# Patient Record
Sex: FEMALE | Race: WHITE | Employment: OTHER | URBAN - METROPOLITAN AREA
[De-identification: names, ages, dates, MRNs, and addresses within clinical notes are randomized per-mention and may not be internally consistent; named-entity substitution may affect disease eponyms.]

---

## 2019-03-18 ENCOUNTER — THERAPY VISIT (OUTPATIENT)
Dept: PHYSICAL THERAPY | Facility: CLINIC | Age: 74
End: 2019-03-18
Payer: COMMERCIAL

## 2019-03-18 DIAGNOSIS — M54.50 ACUTE BILATERAL LOW BACK PAIN WITHOUT SCIATICA: Primary | ICD-10-CM

## 2019-03-18 PROCEDURE — 97161 PT EVAL LOW COMPLEX 20 MIN: CPT | Mod: GP | Performed by: PHYSICAL THERAPIST

## 2019-03-18 PROCEDURE — 97110 THERAPEUTIC EXERCISES: CPT | Mod: GP | Performed by: PHYSICAL THERAPIST

## 2019-03-18 PROCEDURE — 97530 THERAPEUTIC ACTIVITIES: CPT | Mod: GP | Performed by: PHYSICAL THERAPIST

## 2019-03-18 NOTE — PROGRESS NOTES
[facility] Initial Evaluation -- Lumbar    Date: March 18, 2019  Divina Guillen is a 73 year old female with a L LS condition.   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   DOI [id number]  Symptoms (improving/unchanging/worsening):  Worsening     Symptoms commenced as a result of: shoveling  Condition occurred in the following environment:  home  Symptoms at onset (back/thigh/leg): back  Constant symptoms (back/thigh/leg): yes  Intermittent symptoms (back/thigh/leg):     Symptoms are made worse with the following: Always Bending, Always Sitting, Always Standing, Time of day -at end of day and Always  Symptoms are made better with the following: Always Lying    Disturbed sleep (yes/no):  With knees to chest Sleeping postures (prone/sup/side R/L):     Previous episodes (0/1-5/6-10/11+): 1,  L sided sciatica Year of first episode: 2013    Previous history:   Previous treatments: extension responder      Specific Questions:  Cough/Sneeze/Strain (pos/neg): sneezing  Bowel/Bladder (normal/abnormal): normal  Gait (normal/abnormal): abnormal  Medications (nil/NSAIDS/analg/steroids/anticoag/other):  Muscle relaxants  Medical allergies:  none  General health (excellent/good/fair/poor):  good  Pertinent medical history:  None  Imaging (None/Xray/MRI/Other):  none  Recent or major surgery (yes/no):  no  Night pain (yes/no): no  Accidents (yes/no): no  Unexplained weight loss (yes/no): no  Barriers at  home: none  Other red flags: none    EXAMINATION    Posture:   Sitting (good/fair/poor): poor  Standing (good/fair/poor):fair  Lordosis (red/acc/normal): dec  Correction of posture (better/worse/no effect): worse    Lateral Shift (right/left/nil): no  Relevant (yes/no):    Other Observations:     Neurological:    Motor deficit:    Reflexes:    Sensory deficit:    Dural signs:      Movement Loss:   Timoteo Mod Min Nil Pain   Flexion   +lower tibias  PDM hip and LS   Extension  +   ERP hip   Side Gliding R    + L lat hip pulling   Side Gliding L    +      Test Movements:   During: produces, abolishes, increases, decreases, no effect, centralizing, peripheralizing   After: better, worse, no better, no worse, no effect, centralized, peripheralized    Pretest symptoms standing: central LS   Symptoms During Symptoms After ROM increased ROM decreased No Effect   FIS Increases    No Worse         Rep FIS Increases    No Worse      +   EIS Increases   ERP No Worse         Rep EIS Increases  ERP lat hip   No Worse    +     Pretest symptoms lying: L lat hip pain 6/10   Symptoms During Symptoms After ROM increased ROM decreased No Effect   ISABELA prone pre test 3/10 lat hip Decreases    No Better         Rep ISABELA Decreases    Better    + flex and ext     EIL          Rep EIL          If required, pretest symptoms:    Symptoms During Symptoms After ROM increased ROM decreased No Effect   SGIS - R          Rep SGIS - R          SGIS - L          Rep SGIS - L            Static Tests:  Sitting slouched:    Sitting erect:    Standing slouched   Standing erect:    Lying prone in extension:   Long sitting:      Other Tests: pre test sitting w/o L roll 0/10  FISIt 10 x produces LBP DMotion BUT decreased with reps, better sitting SG R inc ROM flex and ext    Provisional Classification:  Derangement - Asymmetrical, unilateral, symptoms above knee    Principle of Management:  Education:  DP HEP    Equipment provided:    Mechanical therapy (Y/N):   Y   Extension principle:    Lateral Principle:    Flexion principle:   FISit 10 x q 2hrs and q 1 hr on plane Other:      ASSESSMENT/PLAN:    Patient is a 73 year old female with lumbar complaints.    Patient has the following significant findings with corresponding treatment plan.                Diagnosis 1:  LBP  Pain -  self management, education, directional preference exercise and home program  Decreased ROM/flexibility - therapeutic exercise, therapeutic activity and home program  Decreased joint mobility - therapeutic exercise, therapeutic activity and home program  Decreased function - therapeutic activities and home program    Previous and current functional limitations:  (See Goal Flow Sheet for this information)    Short term and Long term goals: (See Goal Flow Sheet for this information)     Communication ability:  Patient appears to be able to clearly communicate and understand verbal and written communication and follow directions correctly.  Treatment Explanation - The following has been discussed with the patient:   RX ordered/plan of care  Anticipated outcomes  Possible risks and side effects  This patient would benefit from PT intervention to resume normal activities.   Rehab potential is good.    Frequency:  1 X week, once daily  Duration:  for 6 weeks  Discharge Plan:  Achieve all LTG.  Independent in home treatment program.  Reach maximal therapeutic benefit.    Please refer to the daily flowsheet for treatment today, total treatment time and time spent performing 1:1 timed codes.

## 2019-03-18 NOTE — LETTER
[facility]  [address]   [place] [address]  [phone]    2019    Re: Divina Guillen   :   1945  MRN:  [id number]   REFERRING PHYSICIAN:   Maddison Cutler    [facility]    Date of Initial Evaluation:  2019  Visits:  Rxs Used: 1  Reason for Referral:  Acute bilateral low back pain without sciatica    EVALUATION SUMMARY    [facility] Initial Evaluation -- Lumbar    Date: 2019  Divina Guillen is a 73 year old female with a L LS condition.   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   DOI [id number]  Symptoms (improving/unchanging/worsening):  Worsening     Symptoms commenced as a result of: shoveling  Condition occurred in the following environment:  home  Symptoms at onset (back/thigh/leg): back  Constant symptoms (back/thigh/leg): yes  Intermittent symptoms (back/thigh/leg):     Symptoms are made worse with the following: Always Bending, Always Sitting, Always Standing, Time of day -at end of day and Always  Symptoms are made better with the following: Always Lying    Disturbed sleep (yes/no):  With knees to chest Sleeping postures (prone/sup/side R/L):     Previous episodes (0/1-5/6-10/+): 1,  L sided sciatica Year of first episode:     Previous history:   Previous treatments: extension responder    Specific Questions:  Cough/Sneeze/Strain  (pos/neg): sneezing  Bowel/Bladder (normal/abnormal): normal  Gait (normal/abnormal): abnormal  Medications (nil/NSAIDS/analg/steroids/anticoag/other):  Muscle relaxants  Medical allergies:  none  General health (excellent/good/fair/poor):  good  Pertinent medical history:  None  Imaging (None/Xray/MRI/Other):  none  Recent or major surgery (yes/no):  no  Night pain (yes/no): no  Accidents (yes/no): no  Unexplained weight loss (yes/no): no  Barriers at home: none  Other red flags: none    EXAMINATION    Posture:   Sitting (good/fair/poor): poor  Standing (good/fair/poor):fair  Lordosis (red/acc/normal): dec  Correction of posture (better/worse/no effect): worse    Lateral Shift (right/left/nil): no  Relevant (yes/no):    Other Observations:     Neurological:    Motor deficit:    Reflexes:    Sensory deficit:    Dural signs:      Movement Loss:   Timoteo Mod Min Nil Pain   Flexion   +lower tibias  PDM hip and LS   Extension  +   ERP hip   Side Gliding R    + L lat hip pulling   Side Gliding L    +      Test Movements:   During: produces, abolishes, increases, decreases, no effect, centralizing, peripheralizing   After: better, worse, no better, no worse, no effect, centralized, peripheralized    Pretest symptoms standing: central LS   Symptoms During Symptoms After ROM increased ROM decreased No Effect   FIS Increases    No Worse         Rep FIS Increases    No Worse      +   EIS Increases   ERP No Worse         Rep EIS Increases  ERP lat hip   No Worse    +     Pretest symptoms lying: L lat hip pain 6/10   Symptoms During Symptoms After ROM increased ROM decreased No Effect   ISABELA prone pre test 3/10 lat hip Decreases    No Better         Rep ISABELA Decreases    Better    + flex and ext     EIL          Rep EIL          If required, pretest symptoms:    Symptoms During Symptoms After ROM increased ROM decreased No Effect   SGIS - R          Rep SGIS - R          SGIS - L          Rep SGIS - L            Static  Tests:  Sitting slouched:    Sitting erect:    Standing slouched   Standing erect:    Lying prone in extension:   Long sitting:      Other Tests: pre test sitting w/o L roll 0/10  FISIt 10 x produces LBP DMotion BUT decreased with reps, better sitting SG R inc ROM flex and ext    Provisional Classification:  Derangement - Asymmetrical, unilateral, symptoms above knee    Principle of Management:  Education:  DP HEP    Equipment provided:    Mechanical therapy (Y/N):  Y   Extension principle:    Lateral Principle:    Flexion principle:   FISit 10 x q 2hrs and q 1 hr on plane Other:      ASSESSMENT/PLAN:    Patient is a 73 year old female with lumbar complaints.    Patient has the following significant findings with corresponding treatment plan.                Diagnosis 1:  LBP  Pain -  self management, education, directional preference exercise and home program  Decreased ROM/flexibility - therapeutic exercise, therapeutic activity and home program  Decreased joint mobility - therapeutic exercise, therapeutic activity and home program  Decreased function - therapeutic activities and home program    Previous and current functional limitations:  (See Goal Flow Sheet for this information)    Short term and Long term goals: (See Goal Flow Sheet for this information)     Communication ability:  Patient appears to be able to clearly communicate and understand verbal and written communication and follow directions correctly.  Treatment Explanation - The following has been discussed with the patient:   RX ordered/plan of care  Anticipated outcomes  Possible risks and side effects  This patient would benefit from PT intervention to resume normal activities.   Rehab potential is good.    Frequency:  1 X week, once daily  Duration:  for 6 weeks  Discharge Plan:  Achieve all LTG.  Independent in home treatment program.  Reach maximal therapeutic benefit.    Thank you for your referral.    INQUIRIES  Therapist: Donna Giang  PT  [facility]  [address]  [place] [address]  Phone: [phone]  Fax: [phone]

## 2019-03-27 ENCOUNTER — THERAPY VISIT (OUTPATIENT)
Dept: PHYSICAL THERAPY | Facility: CLINIC | Age: 74
End: 2019-03-27
Payer: COMMERCIAL

## 2019-03-27 DIAGNOSIS — M54.50 ACUTE BILATERAL LOW BACK PAIN WITHOUT SCIATICA: ICD-10-CM

## 2019-03-27 PROCEDURE — 97110 THERAPEUTIC EXERCISES: CPT | Mod: GP | Performed by: PHYSICAL THERAPY ASSISTANT

## 2019-03-27 PROCEDURE — 97530 THERAPEUTIC ACTIVITIES: CPT | Mod: GP | Performed by: PHYSICAL THERAPY ASSISTANT

## 2019-04-10 ENCOUNTER — THERAPY VISIT (OUTPATIENT)
Dept: PHYSICAL THERAPY | Facility: CLINIC | Age: 74
End: 2019-04-10
Payer: COMMERCIAL

## 2019-04-10 DIAGNOSIS — M54.50 ACUTE BILATERAL LOW BACK PAIN WITHOUT SCIATICA: Primary | ICD-10-CM

## 2019-04-10 PROCEDURE — 97530 THERAPEUTIC ACTIVITIES: CPT | Mod: GP | Performed by: PHYSICAL THERAPIST

## 2019-04-10 PROCEDURE — 97110 THERAPEUTIC EXERCISES: CPT | Mod: GP | Performed by: PHYSICAL THERAPIST

## 2019-04-24 ENCOUNTER — THERAPY VISIT (OUTPATIENT)
Dept: PHYSICAL THERAPY | Facility: CLINIC | Age: 74
End: 2019-04-24
Payer: COMMERCIAL

## 2019-04-24 DIAGNOSIS — M54.50 ACUTE BILATERAL LOW BACK PAIN WITHOUT SCIATICA: Primary | ICD-10-CM

## 2019-04-24 PROCEDURE — 97110 THERAPEUTIC EXERCISES: CPT | Mod: GP | Performed by: PHYSICAL THERAPIST

## 2019-04-24 NOTE — PROGRESS NOTES
DISCHARGE REPORT    Progress reporting period is from [id number] to [id number]    SUBJECTIVE  Subjective changes noted by patient: Pt. was gardening for 1.5 hrs and had increased pain so did the exercises and it helped. she is able to walk 2 miles on TM and sit thru movie.    Current Pain level: 0/10.     Initial Pain level: 5/10.   Changes in function:  Yes (See Goal flowsheet attached for changes in current functional level)  Adverse reaction to treatment or activity: None    OBJECTIVE  Objective: able to progress bridge today. TrA 3+/5. HAB  4+/5 improved for both.      ASSESSMENT/PLAN  Updated problem list and treatment plan:   Diagnosis 1:  LBP  Pain -  home program  Decreased ROM/flexibility - home program  STG/LTGs have been met or progress has been made towards goals:  Yes (See Goal flow sheet completed today.)  Assessment of Progress: The patient has met all of their long term goals.  Self Management Plans:  Patient is independent in a home treatment program.  Patient is independent in self management of symptoms.    Divina continues to require the following intervention to meet STG and LTG's:  PT intervention is no longer required to meet STG/LTG.    Recommendations:  This patient is ready to be discharged from therapy and continue their home treatment program.

## 2020-09-30 ENCOUNTER — THERAPY VISIT (OUTPATIENT)
Dept: PHYSICAL THERAPY | Facility: CLINIC | Age: 75
End: 2020-09-30
Payer: COMMERCIAL

## 2020-09-30 DIAGNOSIS — M54.2 NECK PAIN: Primary | ICD-10-CM

## 2020-09-30 PROCEDURE — 97110 THERAPEUTIC EXERCISES: CPT | Mod: GP | Performed by: PHYSICAL THERAPIST

## 2020-09-30 PROCEDURE — 97161 PT EVAL LOW COMPLEX 20 MIN: CPT | Mod: GP | Performed by: PHYSICAL THERAPIST

## 2020-09-30 PROCEDURE — 97530 THERAPEUTIC ACTIVITIES: CPT | Mod: GP | Performed by: PHYSICAL THERAPIST

## 2020-09-30 NOTE — PROGRESS NOTES
[facility] Initial Evaluation -- Cervical    Evaluation Date: September 30, 2020  Divina Guillen is a 75 year old female with a neck condition.   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  she slept in a chair for a few days at onset. Shifting car and doing seat belt produced upper arm pain. Neck really stiff. LHD.  Pain quality (sharp/shooting/stabbing/aching/burning/cramping):   Achy burning  Paresthesia (yes/no):  no    Present since (onset date): MD order [id number].   3 weeks ago for LARA, gradually.   Symptoms (improving/unchanging/worsening):  Improving with steroid    Symptoms commenced as a result of:   Condition occurred in the following environment:      Symptoms at onset (neck/arm/forearm/headache): CS  Constant symptoms (neck/arm/forearm/headache):   Intermittent symptoms (neck/arm/forearm/headache): yes for all    Symptoms are made worse with the following: Lying and shifting car turning head pulling seat belt  Bending reaching always  Symptoms are made better with the following: steroid     Disturbed sleep (yes/no): SL on either side  Number of pillows:   Sleeping postures (prone/sup/side R/L): SL    Previous episodes (0/1-5/6-10/11+): 0 Year of first episode:     Previous history:   Previous treatments:     Specific Questions: (as reported by the patient)  Dizziness/Tinnitus/Nausea/Swallowing (pos/neg): neg  Gait/Upper Limbs (normal/abnormal): normal  Medications (nil/NSAIDS/anlag/steroids/anticoag/other):  Narcotics/Opiods  Medical allergies:  none  General health (excellent/good/fair/poor):  excellent  Pertinent medical history:  none  Imaging (None/Xray/MRI/Other):  XR neg  Recent or major surgery (yes/no): no  Night pain  (yes/no):   Accidents (yes/no):   Unexplained weight loss (yes/no): na  Barriers at home: lives alone  Other red flags: none    EXAMINATION    Posture:   Sitting (good/fair/poor): poor   Standing (good/fair/poor): poor     Protruded head (yes/no): yes    Wry Neck (right/left/nil):  no  Relevant (yes/no):       Correction of posture(better/worse/no effect): better  Other observations:      Neurological:    Motor Deficit:     Reflexes:    Sensory Deficit:     Dural signs:      Movement Loss:   Timoteo Mod Min Nil Pain   Protrusion    + Sub occ   Flexion    + R BOH   Retraction   +  BOH   Extension  +   decreases   Lateral flexion R +       Lateral flexion L  +      Rotation R     Pain BOH R side    Rotation L  +   Pain BOH     Test Movements:   During: produces, abolishes, increases, decreases, no effect, centralizing, peripheralizing  After: better, worse, no better, no worse, no effect, centralized, peripheralized    Pretest symptoms sittin/10   Symptoms During Symptoms After ROM increased ROM decreased No Effect   PRO          Rep PRO          RET          Rep RET          RET EXT          Rep RET EXT Decreases    No better    + rot B extension     Pretest symptoms lying:     Symptoms During Symptoms After ROM increased ROM decreased No Effect   RET          Rep RET          RET EXT          Rep RET EXT          If required, pretest symptoms sitting:            Symptoms During Symptoms After ROM increased ROM decreased No Effect   LF-R          Rep LF-R          LF-L          Rep LF-L          ROT-R          Rep ROT-R          ROT-L          Rep ROT-L          FLEX          Rep FLEX              Static Tests:   Protrusion:          Flexion:    Retraction:    Extension (sitting/prone/supine):      Other Tests:     Provisional Classification:  Inconclusive/Other - needs further testing    Principle of Management:  Education:  Proper sitting posture to avoid FH. Use of towel BB in vertical fashion.    Equipment  provided:    Mechanical therapy (Y/N):  Y   Extension principle:     Lateral principle:    Flexion principle:     Other:      ASSESSMENT/PLAN:    Patient is a 75 year old female with cervical complaints.    Patient has the following significant findings with corresponding treatment plan.                Diagnosis 1:  Neck pain  Pain -  manual therapy, self management, education, directional preference exercise and home program  Decreased ROM/flexibility - manual therapy, therapeutic exercise, therapeutic activity and home program  Decreased joint mobility - manual therapy, therapeutic exercise, therapeutic activity and home program  Decreased function - therapeutic activities and home program  Impaired posture - neuro re-education, therapeutic activities and home program        Previous and current functional limitations:  (See Goal Flow Sheet for this information)    Short term and Long term goals: (See Goal Flow Sheet for this information)     Communication ability:  Patient appears to be able to clearly communicate and understand verbal and written communication and follow directions correctly.  Treatment Explanation - The following has been discussed with the patient:   RX ordered/plan of care  Anticipated outcomes  Possible risks and side effects  This patient would benefit from PT intervention to resume normal activities.   Rehab potential is good.    Frequency:  1 X week, once daily  Duration:  for 6 weeks  Discharge Plan:  Achieve all LTG.  Independent in home treatment program.  Reach maximal therapeutic benefit.    Please refer to the daily flowsheet for treatment today, total treatment time and time spent performing 1:1 timed codes.

## 2020-09-30 NOTE — LETTER
[facility] ATHLETIC [facility] PHYSICAL THERAPY  [address]  [facility] [address]  [phone]    2020    Re: Divina Guillen   :   1945  MRN:  [id number]   REFERRING PHYSICIAN:   Almita Turcios    [facility]THLETIC [facility] PHYSICAL [facility]    Date of Initial Evaluation:  2020  Visits:   1  Reason for Referral:  Neck pain     [facility] Athletic [facility] Initial Evaluation -- Cervical  Evaluation Date: 2020  Divina Guillen is a 75 year old female with a neck condition.   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  she slept in a chair for a few days at onset. Shifting car and doing seat belt produced upper arm pain. Neck really stiff. LHD.  Pain quality (sharp/shooting/stabbing/aching/burning/cramping):   Achy burning  Paresthesia (yes/no):  no  Present since (onset date): MD order [id number].   3 weeks ago for LARA, gradually.   Symptoms (improving/unchanging/worsening):  Improving with steroid  Symptoms commenced as a result of:   Condition occurred in the following environment:    Symptoms at onset (neck/arm/forearm/headache): CS  Constant symptoms (neck/arm/forearm/headache):   Intermittent symptoms (neck/arm/forearm/headache): yes for all  Symptoms are made worse with the following: Lying and shifting car turning head pulling seat belt  Bending reaching always  Symptoms are made better with the following: steroid   Disturbed sleep (yes/no): SL on either side    Number of pillows:   Sleeping postures (prone/sup/side R/L): SL  Previous episodes (0/1-5/6-10/11+): 0   Year of first episode:   Previous history:   Previous treatments:     Re: Divina Guillen   :    1945    Specific Questions: (as reported by the patient)  Dizziness/Tinnitus/Nausea/Swallowing (pos/neg): neg  Gait/Upper Limbs (normal/abnormal): normal  Medications (nil/NSAIDS/anlag/steroids/anticoag/other):  Narcotics/Opiods  Medical allergies:  none  General health (excellent/good/fair/poor):  excellent  Pertinent medical history:  none  Imaging (None/Xray/MRI/Other):  XR neg  Recent or major surgery (yes/no): no  Night pain (yes/no):   Accidents (yes/no):   Unexplained weight loss (yes/no): na  Barriers at home: lives alone  Other red flags: none  EXAMINATION  Posture:   Sitting (good/fair/poor): poor     Standing (good/fair/poor): poor   Protruded head (yes/no): yes    Wry Neck (right/left/nil):  no    Relevant (yes/no):     Correction of posture(better/worse/no effect): better  Other observations:    Neurological:  Motor Deficit:     Reflexes:    Sensory Deficit:     Dural signs:    Movement Loss:   Timoteo Mod Min Nil Pain   Protrusion    + Sub occ   Flexion    + R BOH   Retraction   +  BOH   Extension  +   decreases   Lateral flexion R +       Lateral flexion L  +      Rotation R     Pain BOH R side    Rotation L  +   Pain BOH   Test Movements:   During: produces, abolishes, increases, decreases, no effect, centralizing, peripheralizing  After: better, worse, no better, no worse, no effect, centralized, peripheralized  Pretest symptoms sittin/10   Symptoms During Symptoms After ROM increased ROM decreased No Effect   PRO          Rep PRO          RET          Rep RET          RET EXT          Rep RET EXT Decreases    No better    + rot B extension     Re: Divina Guillen   :   1945    Pretest symptoms lying:     Symptoms During Symptoms After ROM increased ROM decreased No Effect   RET          Rep RET          RET EXT          Rep RET EXT          If required, pretest symptoms sitting:     Symptoms During Symptoms After ROM increased ROM decreased No Effect   LF-R          Rep LF-R          LF-L           Rep LF-L          ROT-R          Rep ROT-R          ROT-L          Rep ROT-L          FLEX          Rep FLEX          Static Tests:   Protrusion:    Flexion:    Retraction:      Extension (sitting/prone/supine):    Other Tests:   Provisional Classification:  Inconclusive/Other - needs further testing  Principle of Management:  Education:  Proper sitting posture to avoid FH. Use of towel BB in vertical fashion  Equipment provided:    Mechanical therapy (Y/N):  Y   Extension principle:       Lateral principle:    Flexion principle:       Other:      ASSESSMENT/PLAN:  Patient is a 75 year old female with cervical complaints.    Patient has the following significant findings with corresponding treatment plan.                Diagnosis 1:  Neck pain  Pain -  manual therapy, self management, education, directional preference exercise and home program  Decreased ROM/flexibility - manual therapy, therapeutic exercise, therapeutic activity and home program  Decreased joint mobility - manual therapy, therapeutic exercise, therapeutic activity and home program  Decreased function - therapeutic activities and home program  Impaired posture - neuro re-education, therapeutic activities and home program  Previous and current functional limitations:  (See Goal Flow Sheet for this information)    Short term and Long term goals: (See Goal Flow Sheet for this information)   Re: Divina Guillen   :   1945    ASSESSMENT/PLAN: (continued)  Communication ability:  Patient appears to be able to clearly communicate and understand verbal and written communication and follow directions correctly.  Treatment Explanation - The following has been discussed with the patient:   RX ordered/plan of care, Anticipated outcomes, Possible risks and side effects    This patient would benefit from PT intervention to resume normal activities.   Rehab potential is good.  Frequency:  1 X week, once daily  Duration:  for 6 weeks  Discharge Plan:   Achieve all LTG.  Independent in home treatment program.  Reach maximal therapeutic benefit.    Thank you for your referral.    INQUIRIES        Therapist:  Donna Giang PT, Cert MDT  [facility] PHYSICAL THERAPY  [address] [facility] [address]  [facility] [address]  Phone: [phone]  Fax: [phone]

## 2020-10-05 ENCOUNTER — THERAPY VISIT (OUTPATIENT)
Dept: PHYSICAL THERAPY | Facility: CLINIC | Age: 75
End: 2020-10-05
Payer: COMMERCIAL

## 2020-10-05 DIAGNOSIS — M54.2 NECK PAIN: ICD-10-CM

## 2020-10-05 PROCEDURE — 97110 THERAPEUTIC EXERCISES: CPT | Mod: GP | Performed by: PHYSICAL THERAPY ASSISTANT

## 2020-10-05 PROCEDURE — 97530 THERAPEUTIC ACTIVITIES: CPT | Mod: GP | Performed by: PHYSICAL THERAPY ASSISTANT

## 2020-10-15 ENCOUNTER — THERAPY VISIT (OUTPATIENT)
Dept: PHYSICAL THERAPY | Facility: CLINIC | Age: 75
End: 2020-10-15
Payer: COMMERCIAL

## 2020-10-15 DIAGNOSIS — M54.2 NECK PAIN: ICD-10-CM

## 2020-10-15 PROCEDURE — 97530 THERAPEUTIC ACTIVITIES: CPT | Mod: GP | Performed by: PHYSICAL THERAPY ASSISTANT

## 2020-10-15 PROCEDURE — 97110 THERAPEUTIC EXERCISES: CPT | Mod: GP | Performed by: PHYSICAL THERAPY ASSISTANT

## 2020-10-19 ENCOUNTER — THERAPY VISIT (OUTPATIENT)
Dept: PHYSICAL THERAPY | Facility: CLINIC | Age: 75
End: 2020-10-19
Payer: COMMERCIAL

## 2020-10-19 DIAGNOSIS — M54.2 NECK PAIN: ICD-10-CM

## 2020-10-19 PROCEDURE — 97110 THERAPEUTIC EXERCISES: CPT | Mod: GP | Performed by: PHYSICAL THERAPY ASSISTANT

## 2020-10-26 ENCOUNTER — THERAPY VISIT (OUTPATIENT)
Dept: PHYSICAL THERAPY | Facility: CLINIC | Age: 75
End: 2020-10-26
Payer: COMMERCIAL

## 2020-10-26 DIAGNOSIS — M54.2 NECK PAIN: ICD-10-CM

## 2020-10-26 PROCEDURE — 97110 THERAPEUTIC EXERCISES: CPT | Mod: GP | Performed by: PHYSICAL THERAPY ASSISTANT

## 2020-10-26 PROCEDURE — 97530 THERAPEUTIC ACTIVITIES: CPT | Mod: GP | Performed by: PHYSICAL THERAPY ASSISTANT

## 2020-11-09 ENCOUNTER — THERAPY VISIT (OUTPATIENT)
Dept: PHYSICAL THERAPY | Facility: CLINIC | Age: 75
End: 2020-11-09
Payer: COMMERCIAL

## 2020-11-09 DIAGNOSIS — M54.50 ACUTE BILATERAL LOW BACK PAIN WITHOUT SCIATICA: ICD-10-CM

## 2020-11-09 DIAGNOSIS — M25.512 BILATERAL SHOULDER PAIN, UNSPECIFIED CHRONICITY: Primary | ICD-10-CM

## 2020-11-09 DIAGNOSIS — M25.511 BILATERAL SHOULDER PAIN, UNSPECIFIED CHRONICITY: Primary | ICD-10-CM

## 2020-11-09 PROCEDURE — 97110 THERAPEUTIC EXERCISES: CPT | Mod: GP | Performed by: PHYSICAL THERAPIST

## 2020-11-09 PROCEDURE — 97530 THERAPEUTIC ACTIVITIES: CPT | Mod: GP | Performed by: PHYSICAL THERAPIST

## 2020-11-09 NOTE — PROGRESS NOTES
PROGRESS  REPORT    Progress reporting period is from [id number]  to [id number]       SUBJECTIVE  Subjective changes noted by patient:  Subjective: Pt reports her neck ROM is wnl and her pain is minimal. Her upper arm pain is bad bilaterally and symmetrical with sleeping, reaching OH, to side and BB.     .     Initial Pain level: 6/10.   Changes in function:  Yes (See Goal flowsheet attached for changes in current functional level) for neck.  Adverse reaction to treatment or activity: None    OBJECTIVE  Objective: standing posture is fair to poor with rounded shds and FHP and she is able to correct with cues. AROM R shd:  abd 90 ER min loss ext 38 IR 1 inch above waistline       ASSESSMENT/PLAN  Updated problem list and treatment plan:   Diagnosis 1:  Neck pain, shd pain  Pain -  manual therapy, self management, education, directional preference exercise and home program  Decreased ROM/flexibility - manual therapy, therapeutic exercise, therapeutic activity and home program  Decreased joint mobility - manual therapy, therapeutic exercise, therapeutic activity and home program  Decreased function - therapeutic activities and home program  STG/LTGs have been met or progress has been made towards goals:  None  Assessment of Progress: The patient's condition is unchanged.  Self Management Plans:  Patient is independent in a home treatment program.  Patient is independent in self management of symptoms.    Divina continues to require the following intervention to meet STG and LTG's:  PT    Recommendations:  This patient would benefit from continued therapy.     Frequency:  1 X week, once daily  Duration:  for 6 weeks for shoulders.

## 2020-11-09 NOTE — LETTER
[facility] ATHLETIC [facility] PHYSICAL THERAPY  [address]  [facility] [address]  [phone]    November 10, 2020    Re: Divina Guillen   :   1945  MRN:  [id number]   REFERRING PHYSICIAN:   Almita Turcios    [facility] ATHLETIC [facility] PHYSICAL [facility]    Date of Initial Evaluation:  2020  Visits:   6  Reason for Referral:     Acute bilateral low back pain without sciatica  Bilateral shoulder pain, unspecified chronicity    PROGRESS  REPORT: Progress reporting period is from   to [id number]       SUBJECTIVE  Subjective changes noted by patient:  Subjective: Pt reports her neck ROM is wnl and her pain is minimal. Her upper arm pain is bad bilaterally and symmetrical with sleeping, reaching OH, to side and BB.  .     Initial Pain level: 6/10.   Changes in function:  Yes (See Goal flowsheet attached for changes in current functional level) for neck.  Adverse reaction to treatment or activity: None    OBJECTIVE  Objective: standing posture is fair to poor with rounded shds and FHP and she is able to correct with cues. AROM R shd:  abd 90 ER min loss ext 38 IR 1 inch above waistline     ASSESSMENT/PLAN  Updated problem list and treatment plan:   Diagnosis 1:  Neck pain, shd pain  Pain -  manual therapy, self management, education, directional preference exercise and home program  Decreased ROM/flexibility - manual therapy, therapeutic exercise, therapeutic activity and home program  Decreased joint mobility - manual therapy, therapeutic exercise, therapeutic activity and home program  Decreased function - therapeutic activities and home program  STG/LTGs have been met or progress has been made towards goals:  None  Assessment of Progress: The patient's condition is unchanged.  Self Management Plans:  Patient is independent in a home treatment program.  Patient is independent in self management of symptoms.  Divina continues to require the  following intervention to meet STG and LTG's:  PT          Re: Divina JELLY Guillen   :   1945    Recommendations:  This patient would benefit from continued therapy.     Frequency:  1 X week, once daily  Duration:  for 6 weeks for shoulders.    Thank you for your referral.    INQUIRIES        Therapist:  Donna Giang, PT, Cert. MDT  [facility] PHYSICAL THERAPY  [address] [facility] [address]  [facility] [address]  Phone: [phone]  Fax: [phone]

## 2020-11-23 ENCOUNTER — THERAPY VISIT (OUTPATIENT)
Dept: PHYSICAL THERAPY | Facility: CLINIC | Age: 75
End: 2020-11-23
Payer: COMMERCIAL

## 2020-11-23 DIAGNOSIS — M54.50 ACUTE MIDLINE LOW BACK PAIN WITHOUT SCIATICA: Primary | ICD-10-CM

## 2020-11-23 PROCEDURE — 97161 PT EVAL LOW COMPLEX 20 MIN: CPT | Mod: GP | Performed by: PHYSICAL THERAPIST

## 2020-11-23 PROCEDURE — 97530 THERAPEUTIC ACTIVITIES: CPT | Mod: GP | Performed by: PHYSICAL THERAPIST

## 2020-11-23 NOTE — LETTER
[facility] ATHLETIC [facility] PHYSICAL THER  [address]  [facility] [address]  [phone]    2020    Re: Divina Guillen   :   1945  MRN:  [id number]   REFERRING PHYSICIAN:   ALYSA Tom    [facility] ATHLETIC [facility] PHYSICAL THERAPY  Date of Initial Evaluation:  2020  Visits:  Rxs Used: 1  Reason for Referral:  Acute midline low back pain without sciatica    [facility] Athletic [facility] Initial Evaluation -- Lumbar  Date: 2020  Divina Guillen is a 75 year old female with a  LS/ leg condition.   Referral: GP  Employment status:  [employer]- part time  Leisure mechanical stresses: can do 1/3 mile as compared to 4 miles on TM 4 x week  Functional disability score (IMAN/STarT Back):    VAS score (0-10): 3-8/10  Patient goals/expectations:  Get back to TM and watch grandchildren ages 4,5 1 x week    HISTORY:  Present symptoms: central LS B buttocks R>L lateral thigh pain  Pain quality (sharp/shooting/stabbing/aching/burning/cramping):  Burning achy   Paresthesia (yes/no):  no  Present since (onset date): 3-4 weeks ago. MD order [id number]    Symptoms (improving/unchanging/worsening): improving  Symptoms commenced as a result of: LARA   Condition occurred in the following environment:   unknown   Symptoms at onset (back/thigh/leg): leg and hip R  Constant symptoms (back/thigh/leg): yes  Intermittent symptoms (back/thigh/leg):   Symptoms are made worse with the following: Always Bending, Always Sitting, Always Rising, Always Lying SL R  Symptoms are made better with the following: Other - prednisone   Disturbed sleep (yes/no):  yes Sleeping postures (prone/sup/side R/L): SL  Previous episodes (0/1-5/6-10/+): 1 Year of first episode:   Previous history:   Previous treatments: PT here- extension responder    Specific Questions:  Cough/Sneeze/Strain (pos/neg): neg  Bowel/Bladder (normal/abnormal): normal  Gait (normal/abnormal):  abnormal  Medications (nil/NSAIDS/analg/steroids/anticoag/other):  Steroids  Medical allergies:  None  Re: Divina Guillen   :   1945    General health (excellent/good/fair/poor):  good  Pertinent medical history:  Neck pain  Imaging (None/Xray/MRI/Other):  no  Recent or major surgery (yes/no):  no  Night pain (yes/no):   Accidents (yes/no): no  Unexplained weight loss (yes/no): na  Barriers at home: lives alone  Other red flags: none    EXAMINATION  Posture:   Sitting (good/fair/poor): fair  Standing (good/fair/poor):poor  Lordosis (red/acc/normal): dec to none  Correction of posture (better/worse/no effect): no effect but L roll comfortable   Lateral Shift (right/left/nil): no  Relevant (yes/no):    Other Observations: STS pain gait: slow flexed forward from waist dec stance time R LE     Neurological:  Motor deficit:    Reflexes:    Sensory deficit:   Dural signs:  Slump + R post proximal thigh    Movement Loss:   Timoteo Mod Min Nil Pain   Flexion   To ankles  PDM R butt   Extension  +   Central LS   Side Gliding R +    R butt+   Side Gliding L  +   L butt     Test Movements:   During: produces, abolishes, increases, decreases, no effect, centralizing, peripheralizing   After: better, worse, no better, no worse, no effect, centralized, peripheralized    Pretest symptoms standing: R butt to crease   Symptoms During Symptoms After ROM increased ROM decreased No Effect   FIS          Rep FIS Produces buttocks R   But less with reps  Better         EIS          Rep EIS Produces   central LS L butt No Worse             Re: Divina JELLY Hermanss   :   1945    Pretest symptoms lyin/10   Symptoms During Symptoms After ROM increased ROM decreased No Effect   ISABELA          Rep ISABELA No Effect    No Effect         EIL          Rep EIL          If required, pretest symptoms:    Symptoms During Symptoms After ROM increased ROM decreased No Effect   SGIS - R          Rep SGIS - R          SGIS - L          Rep SGIS - L             Static Tests:  Sitting slouched:   Sitting erect:    Standing slouched     Standing erect:    Lying prone in extension:     Long sitting:      Other Tests:     Provisional Classification:  Derangement - Asymmetrical, unilateral, symptoms above knee    Principle of Management:  Education:  Centralization DP is flexion which is the opposite of what she did before for sciatica    Equipment provided:    Mechanical therapy (Y/N):  Y   Extension principle:    Lateral Principle:    Flexion principle:  FISit 1/10 q 2-3 hours or more  Other:      ASSESSMENT/PLAN:  Patient is a 75 year old female with LS R buttocks thigh complaints.    Patient has the following significant findings with corresponding treatment plan.                Diagnosis 1:  LBP  Pain -  manual therapy, self management, education, directional preference exercise and home program  Decreased ROM/flexibility - manual therapy, therapeutic exercise, therapeutic activity and home program  Decreased joint mobility - manual therapy, therapeutic exercise, therapeutic activity and home program  Decreased function - therapeutic activities and home program  Previous and current functional limitations:  (See Goal Flow Sheet for this information)    Short term and Long term goals: (See Goal Flow Sheet for this information)   Communication ability:  Patient appears to be able to clearly communicate and understand verbal and written communication and follow directions correctly.    Re: Divina Guillen   :   1945    Treatment Explanation - The following has been discussed with the patient:   RX ordered/plan of care  Anticipated outcomes  Possible risks and side effects  This patient would benefit from PT intervention to resume normal activities.   Rehab potential is good.  Frequency:  1 X week, once daily  Duration:  for 6 weeks  Discharge Plan:  Achieve all LTG.  Independent in home treatment program.  Reach maximal therapeutic benefit.    Thank you for your  referral.    INQUIRIES  Therapist: Donna Giang, PT, Cert MDT  [facility] PH[facility]ICAL [facility]ER  [address]   [place] [address]  Phone: [phone]  Fax: [phone]

## 2020-11-23 NOTE — PROGRESS NOTES
[facility] Initial Evaluation -- Lumbar    Date: November 23, 2020  Divina Guillen is a 75 year old female with a  LS/ leg condition.   Referral: GP  Employment status:  [employer]- part time  Leisure mechanical stresses: can do 1/3 mile as compared to 4 miles on TM 4 x week  Functional disability score (IMAN/STarT Back):    VAS score (0-10): 3-8/10  Patient goals/expectations:  Get back to TM and watch grandchildren ages 4,5 1 x week    HISTORY:    Present symptoms: central LS B buttocks R>L lateral thigh pain  Pain quality (sharp/shooting/stabbing/aching/burning/cramping):  Burning achy   Paresthesia (yes/no):  no    Present since (onset date): 3-4 weeks ago. MD order [id number]    Symptoms (improving/unchanging/worsening): improving    Symptoms commenced as a result of: LARA   Condition occurred in the following environment:   unknown     Symptoms at onset (back/thigh/leg): leg and hip R  Constant symptoms (back/thigh/leg): yes  Intermittent symptoms (back/thigh/leg):     Symptoms are made worse with the following: Always Bending, Always Sitting, Always Rising, Always Lying SL R  Symptoms are made better with the following: Other - prednisone     Disturbed sleep (yes/no):  yes Sleeping postures (prone/sup/side R/L): SL    Previous episodes (0/1-5/6-10/11+): 1 Year of first episode:     Previous history:   Previous treatments: PT here- extension responder      Specific Questions:  Cough/Sneeze/Strain (pos/neg): neg  Bowel/Bladder (normal/abnormal): normal  Gait (normal/abnormal): abnormal  Medications (nil/NSAIDS/analg/steroids/anticoag/other):  Steroids  Medical allergies:  none  General health (excellent/good/fair/poor):  good  Pertinent medical history:  Neck pain  Imaging (None/Xray/MRI/Other):  no  Recent or major surgery (yes/no):  no  Night pain (yes/no):   Accidents (yes/no): no  Unexplained weight loss (yes/no): na  Barriers at home: lives alone  Other red flags:  none    EXAMINATION    Posture:   Sitting (good/fair/poor): fair  Standing (good/fair/poor):poor  Lordosis (red/acc/normal): dec to none  Correction of posture (better/worse/no effect): no effect but L roll comfortable     Lateral Shift (right/left/nil): no  Relevant (yes/no):    Other Observations: STS pain gait: slow flexed forward from waist dec stance time R LE     Neurological:    Motor deficit:    Reflexes:    Sensory deficit:   Dural signs:  Slump + R post proximal thigh    Movement Loss:   Timoteo Mod Min Nil Pain   Flexion   To ankles  PDM R butt   Extension  +   Central LS   Side Gliding R +    R butt+   Side Gliding L  +   L butt     Test Movements:   During: produces, abolishes, increases, decreases, no effect, centralizing, peripheralizing   After: better, worse, no better, no worse, no effect, centralized, peripheralized    Pretest symptoms standing: R butt to crease   Symptoms During Symptoms After ROM increased ROM decreased No Effect   FIS          Rep FIS Produces buttocks R   But less with reps  Better         EIS          Rep EIS Produces   central LS L butt No Worse         Pretest symptoms lyin/10   Symptoms During Symptoms After ROM increased ROM decreased No Effect   ISABELA          Rep ISABELA No Effect    No Effect         EIL          Rep EIL          If required, pretest symptoms:    Symptoms During Symptoms After ROM increased ROM decreased No Effect   SGIS - R          Rep SGIS - R          SGIS - L          Rep SGIS - L            Static Tests:  Sitting slouched:   Sitting erect:      Standing slouched     Standing erect:      Lying prone in extension:     Long sitting:      Other Tests:     Provisional Classification:  Derangement - Asymmetrical, unilateral, symptoms above knee    Principle of Management:  Education:  Centralization DP is flexion which is the opposite of what she did before for sciatica    Equipment provided:    Mechanical therapy (Y/N):  Y   Extension principle:    Lateral  Principle:    Flexion principle:  FISit 1/10 q 2-3 hours or more  Other:      ASSESSMENT/PLAN:    Patient is a 75 year old female with LS R buttocks thigh complaints.    Patient has the following significant findings with corresponding treatment plan.                Diagnosis 1:  LBP  Pain -  manual therapy, self management, education, directional preference exercise and home program  Decreased ROM/flexibility - manual therapy, therapeutic exercise, therapeutic activity and home program  Decreased joint mobility - manual therapy, therapeutic exercise, therapeutic activity and home program  Decreased function - therapeutic activities and home program        Previous and current functional limitations:  (See Goal Flow Sheet for this information)    Short term and Long term goals: (See Goal Flow Sheet for this information)     Communication ability:  Patient appears to be able to clearly communicate and understand verbal and written communication and follow directions correctly.  Treatment Explanation - The following has been discussed with the patient:   RX ordered/plan of care  Anticipated outcomes  Possible risks and side effects  This patient would benefit from PT intervention to resume normal activities.   Rehab potential is good.    Frequency:  1 X week, once daily  Duration:  for 6 weeks  Discharge Plan:  Achieve all LTG.  Independent in home treatment program.  Reach maximal therapeutic benefit.    Please refer to the daily flowsheet for treatment today, total treatment time and time spent performing 1:1 timed codes.

## 2020-12-10 ENCOUNTER — THERAPY VISIT (OUTPATIENT)
Dept: PHYSICAL THERAPY | Facility: CLINIC | Age: 75
End: 2020-12-10
Payer: COMMERCIAL

## 2020-12-10 DIAGNOSIS — M54.50 ACUTE MIDLINE LOW BACK PAIN WITHOUT SCIATICA: Primary | ICD-10-CM

## 2020-12-10 PROCEDURE — 97530 THERAPEUTIC ACTIVITIES: CPT | Mod: GP | Performed by: PHYSICAL THERAPIST

## 2020-12-10 PROCEDURE — 97110 THERAPEUTIC EXERCISES: CPT | Mod: GP | Performed by: PHYSICAL THERAPIST

## 2020-12-15 ENCOUNTER — THERAPY VISIT (OUTPATIENT)
Dept: PHYSICAL THERAPY | Facility: CLINIC | Age: 75
End: 2020-12-15
Payer: COMMERCIAL

## 2020-12-15 DIAGNOSIS — M54.50 ACUTE MIDLINE LOW BACK PAIN WITHOUT SCIATICA: Primary | ICD-10-CM

## 2020-12-15 PROCEDURE — 97110 THERAPEUTIC EXERCISES: CPT | Mod: GP | Performed by: PHYSICAL THERAPIST

## 2020-12-22 ENCOUNTER — THERAPY VISIT (OUTPATIENT)
Dept: PHYSICAL THERAPY | Facility: CLINIC | Age: 75
End: 2020-12-22
Payer: COMMERCIAL

## 2020-12-22 DIAGNOSIS — M54.50 ACUTE MIDLINE LOW BACK PAIN WITHOUT SCIATICA: Primary | ICD-10-CM

## 2020-12-22 PROCEDURE — 97110 THERAPEUTIC EXERCISES: CPT | Mod: GP | Performed by: PHYSICAL THERAPIST

## 2020-12-29 ENCOUNTER — THERAPY VISIT (OUTPATIENT)
Dept: PHYSICAL THERAPY | Facility: CLINIC | Age: 75
End: 2020-12-29
Payer: COMMERCIAL

## 2020-12-29 DIAGNOSIS — M54.50 ACUTE MIDLINE LOW BACK PAIN WITHOUT SCIATICA: Primary | ICD-10-CM

## 2020-12-29 PROCEDURE — 97110 THERAPEUTIC EXERCISES: CPT | Mod: GP | Performed by: PHYSICAL THERAPIST

## 2020-12-29 PROCEDURE — 97112 NEUROMUSCULAR REEDUCATION: CPT | Mod: GP | Performed by: PHYSICAL THERAPIST

## 2021-01-19 ENCOUNTER — THERAPY VISIT (OUTPATIENT)
Dept: PHYSICAL THERAPY | Facility: CLINIC | Age: 76
End: 2021-01-19
Payer: COMMERCIAL

## 2021-01-19 DIAGNOSIS — M54.50 ACUTE MIDLINE LOW BACK PAIN WITHOUT SCIATICA: Primary | ICD-10-CM

## 2021-01-19 PROCEDURE — 97110 THERAPEUTIC EXERCISES: CPT | Mod: GP | Performed by: PHYSICAL THERAPIST

## 2021-01-19 NOTE — PROGRESS NOTES
DISCHARGE REPORT    Progress reporting period is from [id number] to [id number]       SUBJECTIVE  Subjective changes noted by patient:  Subjective: the back is still getting better. she worked more this week and her back was fine. during the day she is good .      .     Initial Pain level: (2-8/10).   Changes in function:  Yes (See Goal flowsheet attached for changes in current functional level)  Adverse reaction to treatment or activity: None    OBJECTIVE  Objective: LSROM: wnl MMT: g.max L 3+ R 4 GM R 4 improved for both  TrA 4-/5      ASSESSMENT/PLAN  Updated problem list and treatment plan:   Diagnosis 1:  LBP  Decreased strength - home program  STG/LTGs have been met or progress has been made towards goals:  Yes (See Goal flow sheet completed today.)  Assessment of Progress: The patient has met all of their long term goals.  Self Management Plans:  Patient is independent in a home treatment program.  Patient is independent in self management of symptoms.    Divina continues to require the following intervention to meet STG and LTG's:  PT intervention is no longer required to meet STG/LTG.    Recommendations:  This patient is ready to be discharged from therapy and continue their home treatment program.